# Patient Record
Sex: MALE | Race: OTHER | Employment: FULL TIME
[De-identification: names, ages, dates, MRNs, and addresses within clinical notes are randomized per-mention and may not be internally consistent; named-entity substitution may affect disease eponyms.]

---

## 2024-10-13 ENCOUNTER — APPOINTMENT (OUTPATIENT)
Facility: HOSPITAL | Age: 24
End: 2024-10-13
Payer: COMMERCIAL

## 2024-10-13 ENCOUNTER — HOSPITAL ENCOUNTER (EMERGENCY)
Facility: HOSPITAL | Age: 24
Discharge: HOME | End: 2024-10-13
Payer: COMMERCIAL

## 2024-10-13 VITALS
DIASTOLIC BLOOD PRESSURE: 89 MMHG | OXYGEN SATURATION: 98 % | RESPIRATION RATE: 18 BRPM | TEMPERATURE: 97.6 F | BODY MASS INDEX: 22.22 KG/M2 | HEART RATE: 59 BPM | HEIGHT: 69 IN | SYSTOLIC BLOOD PRESSURE: 136 MMHG | WEIGHT: 150 LBS

## 2024-10-13 DIAGNOSIS — S62.002A CLOSED NONDISPLACED FRACTURE OF SCAPHOID OF LEFT WRIST, UNSPECIFIED PORTION OF SCAPHOID, INITIAL ENCOUNTER: Primary | ICD-10-CM

## 2024-10-13 PROCEDURE — 99283 EMERGENCY DEPT VISIT LOW MDM: CPT

## 2024-10-13 PROCEDURE — 73130 X-RAY EXAM OF HAND: CPT

## 2024-10-13 PROCEDURE — 73070 X-RAY EXAM OF ELBOW: CPT

## 2024-10-13 PROCEDURE — 73110 X-RAY EXAM OF WRIST: CPT

## 2024-10-13 ASSESSMENT — PAIN SCALES - GENERAL: PAINLEVEL_OUTOF10: 9

## 2024-10-13 ASSESSMENT — PAIN - FUNCTIONAL ASSESSMENT: PAIN_FUNCTIONAL_ASSESSMENT: 0-10

## 2024-10-13 ASSESSMENT — PAIN DESCRIPTION - ORIENTATION: ORIENTATION: LEFT

## 2024-10-13 ASSESSMENT — PAIN DESCRIPTION - LOCATION: LOCATION: HAND

## 2024-10-13 NOTE — ED PROVIDER NOTES
needed, If symptoms worsen      DISCHARGE MEDICATIONS:  There are no discharge medications for this patient.      DISCONTINUED MEDICATIONS:  There are no discharge medications for this patient.             (Please note that portions of this note were completed with a voice recognition program.  Efforts were made to edit the dictations but occasionally words are mis-transcribed.)    Adriana Daniel PA-C (electronically signed)    ANGÉLICA Daniel PA-C am the primary clinician of record.    Dragon Disclaimer     Please note that this dictation was completed with [employer], the computer voice recognition software.  Quite often unanticipated grammatical, syntax, homophones, and other interpretive errors are inadvertently transcribed by the computer software.  Please disregard these errors.  Please excuse any errors that have escaped final proofreading.       Adriana Daniel PA-C  10/13/24 1534
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
## 2024-10-16 ENCOUNTER — OFFICE VISIT (OUTPATIENT)
Age: 24
End: 2024-10-16
Payer: COMMERCIAL

## 2024-10-16 VITALS — WEIGHT: 154.2 LBS | BODY MASS INDEX: 22.84 KG/M2 | HEIGHT: 69 IN

## 2024-10-16 DIAGNOSIS — S62.022A CLOSED TRANSVERSE FRACTURE OF WAIST OF SCAPHOID OF LEFT WRIST, INITIAL ENCOUNTER: Primary | ICD-10-CM

## 2024-10-16 PROCEDURE — 99204 OFFICE O/P NEW MOD 45 MIN: CPT | Performed by: ORTHOPAEDIC SURGERY

## 2024-10-16 RX ORDER — DICLOFENAC SODIUM 75 MG/1
75 TABLET, DELAYED RELEASE ORAL EVERY 12 HOURS PRN
Qty: 20 TABLET | Refills: 0 | Status: SHIPPED | OUTPATIENT
Start: 2024-10-16 | End: 2024-10-26

## 2024-10-16 RX ORDER — HYDROCODONE BITARTRATE AND ACETAMINOPHEN 5; 325 MG/1; MG/1
1 TABLET ORAL EVERY 6 HOURS PRN
Qty: 12 TABLET | Refills: 0 | Status: SHIPPED | OUTPATIENT
Start: 2024-10-16 | End: 2024-10-19
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
## 2024-10-24 ENCOUNTER — TELEPHONE (OUTPATIENT)
Age: 24
End: 2024-10-24

## 2024-10-24 NOTE — TELEPHONE ENCOUNTER
Divine from [facility] is requesting the 10/16/2024 office note and the prescription for patient's brace      Divine from [facility]   -phone [phone]   -fax [phone]

## 2024-12-12 ENCOUNTER — HOSPITAL ENCOUNTER (OUTPATIENT)
Facility: HOSPITAL | Age: 24
Discharge: HOME | End: 2024-12-15
Attending: ORTHOPAEDIC SURGERY

## 2024-12-12 DIAGNOSIS — S62.022A CLOSED TRANSVERSE FRACTURE OF WAIST OF SCAPHOID OF LEFT WRIST, INITIAL ENCOUNTER: ICD-10-CM

## 2024-12-18 ENCOUNTER — HOSPITAL ENCOUNTER (OUTPATIENT)
Facility: HOSPITAL | Age: 24
Discharge: HOME | End: 2024-12-21
Attending: ORTHOPAEDIC SURGERY
Payer: COMMERCIAL

## 2024-12-18 PROCEDURE — 73221 MRI JOINT UPR EXTREM W/O DYE: CPT

## 2025-01-09 ENCOUNTER — OFFICE VISIT (OUTPATIENT)
Age: 25
End: 2025-01-09
Payer: COMMERCIAL

## 2025-01-09 VITALS — WEIGHT: 154 LBS | HEIGHT: 69 IN | BODY MASS INDEX: 22.81 KG/M2

## 2025-01-09 DIAGNOSIS — S62.022G: Primary | ICD-10-CM

## 2025-01-09 PROCEDURE — 99214 OFFICE O/P EST MOD 30 MIN: CPT | Performed by: ORTHOPAEDIC SURGERY

## 2025-01-09 PROCEDURE — 73110 X-RAY EXAM OF WRIST: CPT | Performed by: ORTHOPAEDIC SURGERY
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
## 2025-02-03 ENCOUNTER — OFFICE VISIT (OUTPATIENT)
Age: 25
End: 2025-02-03
Payer: COMMERCIAL

## 2025-02-03 DIAGNOSIS — S62.022K CLOSED COMMINUTED FRACTURE OF WAIST OF SCAPHOID OF LEFT WRIST WITH NONUNION: Primary | ICD-10-CM

## 2025-02-03 PROCEDURE — 73110 X-RAY EXAM OF WRIST: CPT | Performed by: ORTHOPAEDIC SURGERY

## 2025-02-03 PROCEDURE — 99213 OFFICE O/P EST LOW 20 MIN: CPT | Performed by: ORTHOPAEDIC SURGERY
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
## 2025-02-25 ENCOUNTER — TELEPHONE (OUTPATIENT)
Age: 25
End: 2025-02-25

## 2025-02-25 NOTE — TELEPHONE ENCOUNTER
Patient states that [facility] in [place] advised him that they still don't have his referral.    His appt at [facility] is tomorrow.  Please re-fax and advised the patient once it has been re-sent as soon as possible.    Patient can be reached at [phone].